# Patient Record
Sex: FEMALE | Race: WHITE | NOT HISPANIC OR LATINO | Employment: OTHER | URBAN - METROPOLITAN AREA
[De-identification: names, ages, dates, MRNs, and addresses within clinical notes are randomized per-mention and may not be internally consistent; named-entity substitution may affect disease eponyms.]

---

## 2022-11-16 ENCOUNTER — MEDICAL CORRESPONDENCE (OUTPATIENT)
Dept: HEALTH INFORMATION MANAGEMENT | Facility: CLINIC | Age: 67
End: 2022-11-16

## 2022-11-17 ENCOUNTER — TRANSCRIBE ORDERS (OUTPATIENT)
Dept: OTHER | Age: 67
End: 2022-11-17

## 2022-11-17 DIAGNOSIS — H11.232 SYMBLEPHARON OF LEFT EYE: Primary | ICD-10-CM

## 2022-11-17 DIAGNOSIS — H02.009 ENTROPION, UNSPECIFIED LATERALITY: ICD-10-CM

## 2023-01-11 ENCOUNTER — OFFICE VISIT (OUTPATIENT)
Dept: OPHTHALMOLOGY | Facility: CLINIC | Age: 68
End: 2023-01-11
Attending: OPHTHALMOLOGY
Payer: MEDICARE

## 2023-01-11 DIAGNOSIS — H11.232 SYMBLEPHARON OF LEFT EYE: ICD-10-CM

## 2023-01-11 DIAGNOSIS — H02.009 ENTROPION, UNSPECIFIED LATERALITY: ICD-10-CM

## 2023-01-11 DIAGNOSIS — H02.016 CICATRICIAL ENTROPION OF LEFT EYE: Primary | ICD-10-CM

## 2023-01-11 DIAGNOSIS — Q11.1 ANOPHTHALMIA: ICD-10-CM

## 2023-01-11 PROBLEM — M85.80 OSTEOPENIA: Status: ACTIVE | Noted: 2018-10-29

## 2023-01-11 PROCEDURE — 92285 EXTERNAL OCULAR PHOTOGRAPHY: CPT | Performed by: OPHTHALMOLOGY

## 2023-01-11 PROCEDURE — 99204 OFFICE O/P NEW MOD 45 MIN: CPT | Performed by: OPHTHALMOLOGY

## 2023-01-11 RX ORDER — DIPHENHYDRAMINE HCL 25 MG
1 CAPSULE ORAL
COMMUNITY

## 2023-01-11 RX ORDER — ATORVASTATIN CALCIUM 10 MG/1
10 TABLET, FILM COATED ORAL
COMMUNITY
Start: 2022-12-05

## 2023-01-11 RX ORDER — UREA 20 %
CREAM (GRAM) TOPICAL
COMMUNITY
Start: 2022-08-26

## 2023-01-11 RX ORDER — PROGESTERONE 200 MG/1
200 CAPSULE ORAL DAILY
COMMUNITY
Start: 2022-12-05

## 2023-01-11 RX ORDER — TRAZODONE HYDROCHLORIDE 100 MG/1
100 TABLET ORAL
COMMUNITY
Start: 2022-11-11

## 2023-01-11 RX ORDER — BETAMETHASONE DIPROPIONATE 0.5 MG/G
OINTMENT, AUGMENTED TOPICAL
COMMUNITY
Start: 2022-08-24

## 2023-01-11 RX ORDER — TOBRAMYCIN AND DEXAMETHASONE 3; 1 MG/ML; MG/ML
SUSPENSION/ DROPS OPHTHALMIC
COMMUNITY
Start: 2022-08-12

## 2023-01-11 ASSESSMENT — VISUAL ACUITY
OD_CC: PROS
OS_CC: 20/25
OS_CC+: +2
METHOD: SNELLEN - LINEAR

## 2023-01-11 ASSESSMENT — TONOMETRY
OS_IOP_MMHG: 09
IOP_METHOD: ICARE

## 2023-01-11 NOTE — LETTER
2023         RE:  :  MRN: Sarah Weinstein  1955  [id number]     Dear Dr. Menchaca,    Thank you for asking me to see your patient, Sarah Weinstein, for an oculoplastic   consultation.  My assessment and plan are below.  For further details, please see my attached clinic note.      Chief Complaint(s) and History of Present Illness(es)     Entropion Evaluation            Laterality: left lower lid          Comments    Patient is here for evaluation of entropion left lower eyelid referred by   Dr. Menchaca.  ---    Chemical injury as a child - lye.     Dr. Crane: PROCEDURES:   1.  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  Placement of a scleral-wrapped hydroxyapatite implant, right eye.   3.  Conjunctivoplasty, right eye.   4.  Mucous membrane graft, right eye.   5.  Entropion repair, right lower lid.   6.  Suture tarsorrhaphy, right eye.            Assessment & Plan     Sarah Weinstein is a 67 year old female with the following diagnoses:   Encounter Diagnoses   Name Primary?     Symblepharon of left eye      Entropion, unspecified laterality      Cicatricial entropion of left eye Yes     Anophthalmia      Options:  1. Observation - risk is she does have entropion with lash cornea touch with risk of corneal ulcer    2. Left lower eyelid release of symblepharon, placement of symblepharon ring, and entropion repair - likely sub-cilliary incision with external rotation sutures to prevent further symblepharon formation. Would inject subconjunctival kenalog.    3. We also talked about the option of a mucous membrane graft, but I think we can address her entropion without a mucosal graft at this time.    Likely option 2, but she will think about it.    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 Not interested in surgical management at this time. She has had multiple procedures in the past.         Again, thank you for allowing me to participate in the  care of your patient.      Sincerely,    Rose Alexandre MD    Oculoplastic and Orbital Surgery   Department of Ophthalmology and Visual Neurosciences  [facility]            Department of Ophthalmology and Visual Neurosciences  [facility]    CC: Mehul Menchaca MD  [facility]  [address] [facility] [address]  Via Fax: [phone]

## 2023-01-11 NOTE — PROGRESS NOTES
Chief Complaint(s) and History of Present Illness(es)     Entropion Evaluation            Laterality: left lower lid          Comments    Patient is here for evaluation of entropion left lower eyelid referred by   Dr. Menchaca.  ---    Chemical injury as a child - maureen.    2010 Dr. Crane: PROCEDURES:   1.  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  Placement of a scleral-wrapped hydroxyapatite implant, right eye.   3.  Conjunctivoplasty, right eye.   4.  Mucous membrane graft, right eye.   5.  Entropion repair, right lower lid.   6.  Suture tarsorrhaphy, right eye.            Assessment & Plan     Sarah Weinstein is a 67 year old female with the following diagnoses:   Encounter Diagnoses   Name Primary?     Symblepharon of left eye      Entropion, unspecified laterality      Cicatricial entropion of left eye Yes     Anophthalmia      Options:  1. Observation - risk is she does have entropion with lash cornea touch with risk of corneal ulcer    2. Left lower eyelid release of symblepharon, placement of symblepharon ring, and entropion repair - likely sub-cilliary incision with external rotation sutures to prevent further symblepharon formation. Would inject subconjunctival kenalog.    3. We also talked about the option of a mucous membrane graft, but I think we can address her entropion without a mucosal graft at this time.    Likely option 2, but she will think about it.    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 Not interested in surgical management at this time. She has had multiple procedures in the past.        Attending Physician Attestation: Complete documentation of historical and exam elements from today's encounter can be found in the full encounter summary report (not reduplicated in this progress note). I personally obtained the chief complaint(s) and history of present illness. I confirmed and edited as necessary the review of systems, past  medical/surgical history, family history, social history, and examination findings as documented by others; and I examined the patient myself. I personally reviewed the relevant tests, images, and reports as documented above. I formulated and edited as necessary the assessment and plan and discussed the findings and management plan with the patient.  -Rose Alexandre MD    Today with Sarah Weinstein  and her , Elver, I reviewed the indications, risks, benefits, and alternatives of the proposed surgical procedure including, but not limited to, failure obtain the desired result  and need for additional surgery, bleeding, infection, loss of vision, injury to the eye, and the remote possibility of permanent damage to any organ system or death with the use of anesthesia.  I provided multiple opportunities for the questions, answered all questions to the best of my ability, and confirmed that my answers and my discussion were understood.   Rose Alexandre MD

## 2023-01-11 NOTE — NURSING NOTE
Chief Complaints and History of Present Illnesses   Patient presents with     Entropion Evaluation       Chief Complaint(s) and History of Present Illness(es)     Entropion Evaluation            Laterality: left lower lid          Comments    Patient is here for evaluation of entropion left lower eyelid referred by Dr. Menchaca.  She has Hx of enucleation of right eyeball.  She notes the left eye does not bother her and she has not noticed any issues.                    ZENAIDA Curry  8:54 AM 01/11/2023

## 2023-01-12 PROBLEM — H02.016 CICATRICIAL ENTROPION OF LEFT EYE: Status: ACTIVE | Noted: 2023-01-12

## 2023-01-12 PROBLEM — H11.232 SYMBLEPHARON OF LEFT EYE: Status: ACTIVE | Noted: 2023-01-12

## 2023-01-12 PROBLEM — H02.009: Status: ACTIVE | Noted: 2023-01-12

## 2023-01-27 ENCOUNTER — ANESTHESIA EVENT (OUTPATIENT)
Dept: SURGERY | Facility: AMBULATORY SURGERY CENTER | Age: 68
End: 2023-01-27
Payer: MEDICARE

## 2023-01-27 RX ORDER — OXYCODONE HYDROCHLORIDE 5 MG/1
10 TABLET ORAL EVERY 4 HOURS PRN
Status: DISCONTINUED | OUTPATIENT
Start: 2023-01-27 | End: 2023-01-31 | Stop reason: HOSPADM

## 2023-01-27 RX ORDER — OXYCODONE HYDROCHLORIDE 5 MG/1
5 TABLET ORAL EVERY 4 HOURS PRN
Status: DISCONTINUED | OUTPATIENT
Start: 2023-01-27 | End: 2023-01-31 | Stop reason: HOSPADM

## 2023-01-27 RX ORDER — FENTANYL CITRATE 50 UG/ML
25 INJECTION, SOLUTION INTRAMUSCULAR; INTRAVENOUS
Status: DISCONTINUED | OUTPATIENT
Start: 2023-01-27 | End: 2023-01-31 | Stop reason: HOSPADM

## 2023-01-30 ENCOUNTER — ANESTHESIA (OUTPATIENT)
Dept: SURGERY | Facility: AMBULATORY SURGERY CENTER | Age: 68
End: 2023-01-30
Payer: MEDICARE

## 2023-01-30 ENCOUNTER — TELEPHONE (OUTPATIENT)
Dept: OPHTHALMOLOGY | Facility: CLINIC | Age: 68
End: 2023-01-30
Payer: MEDICARE

## 2023-01-30 ENCOUNTER — HOSPITAL ENCOUNTER (OUTPATIENT)
Facility: AMBULATORY SURGERY CENTER | Age: 68
Discharge: HOME | End: 2023-01-30
Attending: OPHTHALMOLOGY | Admitting: OPHTHALMOLOGY
Payer: MEDICARE

## 2023-01-30 VITALS
DIASTOLIC BLOOD PRESSURE: 83 MMHG | WEIGHT: 190 LBS | OXYGEN SATURATION: 99 % | BODY MASS INDEX: 30.53 KG/M2 | TEMPERATURE: 98 F | RESPIRATION RATE: 16 BRPM | HEIGHT: 66 IN | SYSTOLIC BLOOD PRESSURE: 127 MMHG

## 2023-01-30 DIAGNOSIS — H02.009 ENTROPION, UNSPECIFIED LATERALITY: ICD-10-CM

## 2023-01-30 DIAGNOSIS — H02.016 CICATRICIAL ENTROPION OF LEFT EYE: ICD-10-CM

## 2023-01-30 DIAGNOSIS — H11.232 SYMBLEPHARON OF LEFT EYE: ICD-10-CM

## 2023-01-30 PROCEDURE — G8907 PT DOC NO EVENTS ON DISCHARG: HCPCS

## 2023-01-30 PROCEDURE — G8918 PT W/O PREOP ORDER IV AB PRO: HCPCS

## 2023-01-30 PROCEDURE — 68320 REVISE/GRAFT EYELID LINING: CPT | Mod: LT | Performed by: OPHTHALMOLOGY

## 2023-01-30 PROCEDURE — 67924 REPAIR EYELID DEFECT: CPT | Mod: LT

## 2023-01-30 PROCEDURE — 68330 REVISE EYELID LINING: CPT | Mod: LT

## 2023-01-30 PROCEDURE — 67924 REPAIR EYELID DEFECT: CPT | Mod: E2 | Performed by: OPHTHALMOLOGY

## 2023-01-30 DEVICE — IMPLANTABLE DEVICE: Type: IMPLANTABLE DEVICE | Site: EYE | Status: FUNCTIONAL

## 2023-01-30 RX ORDER — OXYCODONE HYDROCHLORIDE 5 MG/1
5 TABLET ORAL EVERY 4 HOURS PRN
Status: DISCONTINUED | OUTPATIENT
Start: 2023-01-30 | End: 2023-01-31 | Stop reason: HOSPADM

## 2023-01-30 RX ORDER — LIDOCAINE HYDROCHLORIDE 20 MG/ML
INJECTION, SOLUTION INFILTRATION; PERINEURAL PRN
Status: DISCONTINUED | OUTPATIENT
Start: 2023-01-30 | End: 2023-01-30

## 2023-01-30 RX ORDER — MOXIFLOXACIN 5 MG/ML
1 SOLUTION/ DROPS OPHTHALMIC 3 TIMES DAILY
Qty: 3 ML | Refills: 0 | Status: SHIPPED | OUTPATIENT
Start: 2023-01-30 | End: 2023-02-09

## 2023-01-30 RX ORDER — HYDROXYZINE HYDROCHLORIDE 10 MG/1
10 TABLET, FILM COATED ORAL EVERY 6 HOURS PRN
Status: DISCONTINUED | OUTPATIENT
Start: 2023-01-30 | End: 2023-01-31 | Stop reason: HOSPADM

## 2023-01-30 RX ORDER — PROPOFOL 10 MG/ML
INJECTION, EMULSION INTRAVENOUS CONTINUOUS PRN
Status: DISCONTINUED | OUTPATIENT
Start: 2023-01-30 | End: 2023-01-30

## 2023-01-30 RX ORDER — NEOMYCIN SULFATE, POLYMYXIN B SULFATE AND DEXAMETHASONE 3.5; 10000; 1 MG/ML; [USP'U]/ML; MG/ML
SUSPENSION/ DROPS OPHTHALMIC
Qty: 5 ML | Refills: 0 | Status: SHIPPED | OUTPATIENT
Start: 2023-01-30 | End: 2023-02-08

## 2023-01-30 RX ORDER — LIDOCAINE 40 MG/G
CREAM TOPICAL
Status: DISCONTINUED | OUTPATIENT
Start: 2023-01-30 | End: 2023-01-31 | Stop reason: HOSPADM

## 2023-01-30 RX ORDER — DEXAMETHASONE SODIUM PHOSPHATE 4 MG/ML
INJECTION, SOLUTION INTRA-ARTICULAR; INTRALESIONAL; INTRAMUSCULAR; INTRAVENOUS; SOFT TISSUE PRN
Status: DISCONTINUED | OUTPATIENT
Start: 2023-01-30 | End: 2023-01-30

## 2023-01-30 RX ORDER — ACETAMINOPHEN 325 MG/1
975 TABLET ORAL ONCE
Status: COMPLETED | OUTPATIENT
Start: 2023-01-30 | End: 2023-01-30

## 2023-01-30 RX ORDER — FENTANYL CITRATE 50 UG/ML
25 INJECTION, SOLUTION INTRAMUSCULAR; INTRAVENOUS EVERY 5 MIN PRN
Status: DISCONTINUED | OUTPATIENT
Start: 2023-01-30 | End: 2023-01-31 | Stop reason: HOSPADM

## 2023-01-30 RX ORDER — PHENYLEPHRINE HYDROCHLORIDE 10 MG/ML
INJECTION INTRAVENOUS PRN
Status: DISCONTINUED | OUTPATIENT
Start: 2023-01-30 | End: 2023-01-30

## 2023-01-30 RX ORDER — ONDANSETRON 2 MG/ML
INJECTION INTRAMUSCULAR; INTRAVENOUS PRN
Status: DISCONTINUED | OUTPATIENT
Start: 2023-01-30 | End: 2023-01-30

## 2023-01-30 RX ORDER — ERYTHROMYCIN 5 MG/G
OINTMENT OPHTHALMIC PRN
Status: DISCONTINUED | OUTPATIENT
Start: 2023-01-30 | End: 2023-01-30 | Stop reason: HOSPADM

## 2023-01-30 RX ORDER — SODIUM CHLORIDE, SODIUM LACTATE, POTASSIUM CHLORIDE, CALCIUM CHLORIDE 600; 310; 30; 20 MG/100ML; MG/100ML; MG/100ML; MG/100ML
INJECTION, SOLUTION INTRAVENOUS CONTINUOUS
Status: DISCONTINUED | OUTPATIENT
Start: 2023-01-30 | End: 2023-01-31 | Stop reason: HOSPADM

## 2023-01-30 RX ORDER — ONDANSETRON 4 MG/1
4 TABLET, ORALLY DISINTEGRATING ORAL EVERY 30 MIN PRN
Status: DISCONTINUED | OUTPATIENT
Start: 2023-01-30 | End: 2023-01-31 | Stop reason: HOSPADM

## 2023-01-30 RX ORDER — TRIAMCINOLONE ACETONIDE 40 MG/ML
INJECTION, SUSPENSION INTRA-ARTICULAR; INTRAMUSCULAR PRN
Status: DISCONTINUED | OUTPATIENT
Start: 2023-01-30 | End: 2023-01-30 | Stop reason: HOSPADM

## 2023-01-30 RX ORDER — KETOROLAC TROMETHAMINE 30 MG/ML
15 INJECTION, SOLUTION INTRAMUSCULAR; INTRAVENOUS
Status: DISCONTINUED | OUTPATIENT
Start: 2023-01-30 | End: 2023-01-31 | Stop reason: HOSPADM

## 2023-01-30 RX ORDER — ONDANSETRON 2 MG/ML
4 INJECTION INTRAMUSCULAR; INTRAVENOUS EVERY 30 MIN PRN
Status: DISCONTINUED | OUTPATIENT
Start: 2023-01-30 | End: 2023-01-31 | Stop reason: HOSPADM

## 2023-01-30 RX ORDER — ALBUTEROL SULFATE 0.83 MG/ML
2.5 SOLUTION RESPIRATORY (INHALATION) EVERY 4 HOURS PRN
Status: DISCONTINUED | OUTPATIENT
Start: 2023-01-30 | End: 2023-01-31 | Stop reason: HOSPADM

## 2023-01-30 RX ORDER — HALOPERIDOL 5 MG/ML
1 INJECTION INTRAMUSCULAR
Status: DISCONTINUED | OUTPATIENT
Start: 2023-01-30 | End: 2023-01-31 | Stop reason: HOSPADM

## 2023-01-30 RX ORDER — PROPOFOL 10 MG/ML
INJECTION, EMULSION INTRAVENOUS PRN
Status: DISCONTINUED | OUTPATIENT
Start: 2023-01-30 | End: 2023-01-30

## 2023-01-30 RX ORDER — FENTANYL CITRATE 50 UG/ML
INJECTION, SOLUTION INTRAMUSCULAR; INTRAVENOUS PRN
Status: DISCONTINUED | OUTPATIENT
Start: 2023-01-30 | End: 2023-01-30

## 2023-01-30 RX ORDER — FENTANYL CITRATE 50 UG/ML
50 INJECTION, SOLUTION INTRAMUSCULAR; INTRAVENOUS EVERY 5 MIN PRN
Status: DISCONTINUED | OUTPATIENT
Start: 2023-01-30 | End: 2023-01-31 | Stop reason: HOSPADM

## 2023-01-30 RX ORDER — OXYCODONE HYDROCHLORIDE 5 MG/1
10 TABLET ORAL EVERY 4 HOURS PRN
Status: DISCONTINUED | OUTPATIENT
Start: 2023-01-30 | End: 2023-01-31 | Stop reason: HOSPADM

## 2023-01-30 RX ORDER — LABETALOL HYDROCHLORIDE 5 MG/ML
10 INJECTION, SOLUTION INTRAVENOUS
Status: DISCONTINUED | OUTPATIENT
Start: 2023-01-30 | End: 2023-01-31 | Stop reason: HOSPADM

## 2023-01-30 RX ORDER — TETRACAINE HYDROCHLORIDE 5 MG/ML
SOLUTION OPHTHALMIC PRN
Status: DISCONTINUED | OUTPATIENT
Start: 2023-01-30 | End: 2023-01-30 | Stop reason: HOSPADM

## 2023-01-30 RX ORDER — DIAZEPAM 10 MG/2ML
2.5 INJECTION, SOLUTION INTRAMUSCULAR; INTRAVENOUS
Status: DISCONTINUED | OUTPATIENT
Start: 2023-01-30 | End: 2023-01-31 | Stop reason: HOSPADM

## 2023-01-30 RX ORDER — MEPERIDINE HYDROCHLORIDE 25 MG/ML
12.5 INJECTION INTRAMUSCULAR; INTRAVENOUS; SUBCUTANEOUS EVERY 5 MIN PRN
Status: DISCONTINUED | OUTPATIENT
Start: 2023-01-30 | End: 2023-01-31 | Stop reason: HOSPADM

## 2023-01-30 RX ORDER — ERYTHROMYCIN 5 MG/G
OINTMENT OPHTHALMIC
Qty: 3.5 G | Refills: 0 | Status: SHIPPED | OUTPATIENT
Start: 2023-01-30

## 2023-01-30 RX ORDER — DEXAMETHASONE SODIUM PHOSPHATE 4 MG/ML
4 INJECTION, SOLUTION INTRA-ARTICULAR; INTRALESIONAL; INTRAMUSCULAR; INTRAVENOUS; SOFT TISSUE
Status: DISCONTINUED | OUTPATIENT
Start: 2023-01-30 | End: 2023-01-31 | Stop reason: HOSPADM

## 2023-01-30 RX ORDER — CITRIC ACID/SODIUM CITRATE 334-500MG
30 SOLUTION, ORAL ORAL ONCE
Status: COMPLETED | OUTPATIENT
Start: 2023-01-30 | End: 2023-01-30

## 2023-01-30 RX ADMIN — Medication 50 MG: at 08:48

## 2023-01-30 RX ADMIN — FENTANYL CITRATE 25 MCG: 50 INJECTION, SOLUTION INTRAMUSCULAR; INTRAVENOUS at 08:48

## 2023-01-30 RX ADMIN — ACETAMINOPHEN 975 MG: 325 TABLET ORAL at 08:12

## 2023-01-30 RX ADMIN — SODIUM CHLORIDE, SODIUM LACTATE, POTASSIUM CHLORIDE, CALCIUM CHLORIDE: 600; 310; 30; 20 INJECTION, SOLUTION INTRAVENOUS at 08:39

## 2023-01-30 RX ADMIN — LIDOCAINE HYDROCHLORIDE 60 MG: 20 INJECTION, SOLUTION INFILTRATION; PERINEURAL at 08:48

## 2023-01-30 RX ADMIN — FENTANYL CITRATE 25 MCG: 50 INJECTION, SOLUTION INTRAMUSCULAR; INTRAVENOUS at 08:59

## 2023-01-30 RX ADMIN — PROPOFOL 150 MCG/KG/MIN: 10 INJECTION, EMULSION INTRAVENOUS at 08:48

## 2023-01-30 RX ADMIN — ONDANSETRON 4 MG: 2 INJECTION INTRAMUSCULAR; INTRAVENOUS at 09:09

## 2023-01-30 RX ADMIN — Medication 30 ML: at 08:39

## 2023-01-30 RX ADMIN — PHENYLEPHRINE HYDROCHLORIDE 100 MCG: 10 INJECTION INTRAVENOUS at 08:48

## 2023-01-30 RX ADMIN — DEXAMETHASONE SODIUM PHOSPHATE 8 MG: 4 INJECTION, SOLUTION INTRA-ARTICULAR; INTRALESIONAL; INTRAMUSCULAR; INTRAVENOUS; SOFT TISSUE at 09:09

## 2023-01-30 RX ADMIN — PROPOFOL 150 MG: 10 INJECTION, EMULSION INTRAVENOUS at 08:48

## 2023-01-30 NOTE — ANESTHESIA PROCEDURE NOTES
Airway       Patient location during procedure: OR       Procedure Start/Stop Times: 1/30/2023 8:49 AM  Staff -        Performed By: CRNAIndications and Patient Condition       Indications for airway management: josemanuel-procedural       Induction type:intravenous       Mask difficulty assessment: 2 - vent by mask + OA or adjuvant +/- NMBA    Final Airway Details       Final airway type: endotracheal airway       Successful airway: ETT - single and Oral  Endotracheal Airway Details        ETT size (mm): 7.0       Successful intubation technique: direct laryngoscopy       DL Blade Type: MAC 3       Grade View of Cords: 2       Adjucts: stylet       Position: Right    Post intubation assessment        Placement verified by: capnometry, equal breath sounds and chest rise        Number of attempts at approach: 1       Secured with: plastic tape       Ease of procedure: easy       Dentition: Intact and Unchanged    Medication(s) Administered   Medication Administration Time: 1/30/2023 8:49 AM

## 2023-01-30 NOTE — DISCHARGE INSTRUCTIONS
Post-operative Instructions  Ophthalmic Plastic and Reconstructive Surgery    Rose Alexandre M.D.     All instructions apply to the operated eye(s) or eyelid(s).    Wound care and personal care  ? If a patch or bandage has been placed, please leave this in place until seen by your physician. Ensure that the bandage does not get wet when you take a shower.   ? Apply ice compresses 15 minutes of every hour while awake for 2 days. If you are sleeping, you don't need to wake up to ice. As long as there is no further bleeding, after two days, switch to warm water compresses for five minutes, four times a day until seen by your physician.   ? You may shower or wash your hair the day after surgery. Do not go swimming for at least 2 weeks to prevent contamination of your wounds.  ? You may go for walks and light activity is ok, but no heavy (over 15 pounds) lifting, bending or excessive straining for one week.   ? Do not apply make-up to the eyes or eyelids for 2 weeks after surgery.  ? Expect some swelling, bruising, black eye (even into the lower eyelids and cheeks). Also expect serum caking, crusting and discharge from the eye and/or incisions. A small amount of surface bleeding, and depending on the type of surgery, bleeding from the inside of the eyelid, is normal for the first 48 hours.  ? Avoid straining, bending at the waist, or lifting more than 15 pounds for 1 week. Sleeping with your head elevated, such as in a recliner, for the first several days can help swelling resolve more quickly.   ? Do continue to ambulate (walk) as you normally would - being sedentary after surgery can cause blood clots.   ? Your eye(s) and eyelid(s) may be painful and tender. This is normal after surgery.      Contact information and follow-up  ? Return to the Eye Clinic for a follow-up appointment with your physician as scheduled. If no appointment has been scheduled:     -  [facility]: [phone] for an  appointment with Dr. Alexandre within 2 to 3 weeks from your date of surgery.     ? For severe pain, bleeding, or loss of vision, call the [facility] at [phone] or [facility] at [phone].     After hours or on weekends and holidays, call [phone] and ask to speak with the ophthalmologist on call.    An on call person can be reached after hours for concerns. The on call doctor should not call in medication refill requests after hours or on weekends, so please plan accordingly. An effort has been made to provide adequate pain medications following every surgery, and refills will not be provided in most instances.     Medications  ? Restart all regular home medications and eye drops. If you take Plavix or Aspirin on a regular basis, wait for 72 hours after your surgery before restarting these in order to decrease the risk of bleeding complications.  ? Avoid aspirin and aspirin-like medications (Motrin, Aleve, Ibuprofen, [place] etc) for 72 hours to reduce the risk of bleeding. You may take Tylenol (acetaminophen) for pain.  ? In addition to your home medications, take the following post-operative medications as prescribed by your physician.    ? Apply antibiotic ointment to all sutures three times a day.   Once you run out, you can apply Vaseline or Aquaphor (over the counter) to the incisions. Don't put the Vaseline or Aquaphor into your eyes.   ? Instill the two prescribed eye drops 3 times a day for 10 days. If your symblepharon ring is not removed by day 10, continue the moxifloxacin drops until it is removed.     [facility]  Same-Day Surgery   Adult Discharge Orders & Instructions   For 24 hours after surgery  Get plenty of rest.  A responsible adult must stay with you for at least 24 hours after you leave the hospital.   Do not drive or use heavy equipment.  If you have weakness or tingling, don't drive or use heavy equipment  until this feeling goes away.  Do not drink alcohol.  Avoid strenuous or risky activities.  Ask for help when climbing stairs.   You may feel lightheaded.  IF so, sit for a few minutes before standing.  Have someone help you get up.   If you have nausea (feel sick to your stomach): Drink only clear liquids such as apple juice, ginger ale, broth or 7-Up.  Rest may also help.  Be sure to drink enough fluids.  Move to a regular diet as you feel able.  You may have a slight fever. Call the doctor if your fever is over 100 F (37.7 C) (taken under the tongue) or lasts longer than 24 hours.  You may have a dry mouth, a sore throat, muscle aches or trouble sleeping.  These should go away after 24 hours.  Do not make important or legal decisions.   Call your doctor for any of the followin.  Signs of infection (fever, growing tenderness at the surgery site, a large amount of drainage or bleeding, severe pain, foul-smelling drainage, redness, swelling).    2. It has been over 8 to 10 hours since surgery and you are still not able to urinate (pass water).    3.  Headache for over 24 hours.    4.  Numbness, tingling or weakness the day after surgery (if you had spinal anesthesia).  To contact a doctor, call ___________________________    TYLENOL GIVEN AT 08:15AM, DO NOT TAKE TYLENOL BEFORE 2:00PM

## 2023-01-30 NOTE — BRIEF OP NOTE
[facility]    Brief Operative Note    Pre-operative diagnosis: Symblepharon of left eye [H11.232]  Entropion, unspecified laterality [H02.009]  Cicatricial entropion of left eye [H02.016]  Post-operative diagnosis Same as pre-operative diagnosis    Procedure: Procedure(s):  Left lower eyelid entropion repair  left lower eyelid release of symblepharon and placement of symblepharon ring  Surgeon: Surgeon(s) and Role:     * Rose Alexandre MD - Primary  Anesthesia: General   Estimated Blood Loss: Minimal    Drains: None  Specimens: * No specimens in log *  Findings:   Evidence of symblepharon and cicatricial entropion.  Complications: None.  Implants:   Implant Name Type Inv. Item Serial No.  Lot No. LRB No. Used Action   longterm Symblepharon ring 20mm    longterm [id number] Left 1 Implanted

## 2023-01-30 NOTE — ANESTHESIA POSTPROCEDURE EVALUATION
Patient: Sarah Weinstein    Procedure: Procedure(s):  Left lower eyelid entropion repair  left lower eyelid release of symblepharon and placement of symblepharon ring       Anesthesia Type:  MAC    Note:  Disposition: Outpatient   Postop Pain Control: Uneventful            Sign Out: Well controlled pain   PONV: No   Neuro/Psych: Uneventful            Sign Out: Acceptable/Baseline neuro status   Airway/Respiratory: Uneventful            Sign Out: Acceptable/Baseline resp. status   CV/Hemodynamics: Uneventful            Sign Out: Acceptable CV status; No obvious hypovolemia; No obvious fluid overload   Other NRE:    DID A NON-ROUTINE EVENT OCCUR? No           Last vitals:  Vitals Value Taken Time   /88 01/30/23 0945   Temp 98  F (36.7  C) 01/30/23 0930   Pulse     Resp 16 01/30/23 0945   SpO2 98 % 01/30/23 0945       Electronically Signed By: Diaz Cross MD  January 30, 2023  11:28 AM

## 2023-01-30 NOTE — ANESTHESIA CARE TRANSFER NOTE
Patient: Sarah Weinstein    Procedure: Procedure(s):  Left lower eyelid entropion repair  left lower eyelid release of symblepharon and placement of symblepharon ring       Diagnosis: Symblepharon of left eye [H11.232]  Entropion, unspecified laterality [H02.009]  Cicatricial entropion of left eye [H02.016]  Diagnosis Additional Information: No value filed.    Anesthesia Type:   MAC     Note:    Oropharynx: oropharynx clear of all foreign objects  Level of Consciousness: awake  Oxygen Supplementation: face mask  Level of Supplemental Oxygen (L/min / FiO2): 7  Independent Airway: airway patency satisfactory and stable  Dentition: dentition unchanged  Vital Signs Stable: post-procedure vital signs reviewed and stable  Report to RN Given: handoff report given  Patient transferred to: PACU  Comments: To PACU after suctioned and extubated awake.  Report to RN. VSS, Respiratory status stable.  Handoff Report: Identifed the Patient, Identified the Reponsible Provider, Reviewed the pertinent medical history, Discussed the surgical course, Reviewed Intra-OP anesthesia mangement and issues during anesthesia, Set expectations for post-procedure period and Allowed opportunity for questions and acknowledgement of understanding      Vitals:  Vitals Value Taken Time   BP     Temp     Pulse     Resp     SpO2         Electronically Signed By: LEXUS Adrian CRNA  January 30, 2023  9:35 AM

## 2023-01-30 NOTE — TELEPHONE ENCOUNTER
1/30 Called and left voicemail. Provided phone number [phone] to reschedule post op appointment on 2/15 to 2/8.     Yahaira turner Procedure   Orthopedics, Podiatry, Sports Medicine, Ent ,Eye , Audiology, Adult Endocrine & Diabetes, Nutrition & Medication Therapy Management Specialties   [facility]       ----- Message from Rose Alexandre MD sent at 1/30/2023  8:23 AM CST -----  Regarding: Post op  Dennis Syed, can you change her postop to next Wednesday 2/8.Thanks!

## 2023-01-30 NOTE — ANESTHESIA PREPROCEDURE EVALUATION
Anesthesia Pre-Procedure Evaluation    Patient: Sarah Weinstein   MRN: [id number] : 1955        Procedure : Procedure(s):  Left lower eyelid entropion repair  left lower eyelid release of symblepharon and placement of symblepharon ring          Past Medical History:   Diagnosis Date     Sleep apnea       Past Surgical History:   Procedure Laterality Date     EYE SURGERY       GYN SURGERY       ORTHOPEDIC SURGERY        Allergies   Allergen Reactions     Albuterol Other (See Comments)     headache     Venlafaxine Diarrhea     Major headache , nausea     Naltrexone Dizziness     Feeling faint      Social History     Tobacco Use     Smoking status: Unknown     Smokeless tobacco: Not on file   Substance Use Topics     Alcohol use: Not on file      Wt Readings from Last 1 Encounters:   No data found for Wt        Anesthesia Evaluation   Pt has had prior anesthetic.     No history of anesthetic complications       ROS/MED HX  ENT/Pulmonary:     (+) sleep apnea, uses CPAP,     Neurologic:  - neg neurologic ROS     Cardiovascular:  - neg cardiovascular ROS  (-) murmur   METS/Exercise Tolerance: >4 METS    Hematologic:  - neg hematologic  ROS     Musculoskeletal:  - neg musculoskeletal ROS     GI/Hepatic:     (+) GERD, Symptomatic,     Renal/Genitourinary:  - neg Renal ROS     Endo:  - neg endo ROS     Psychiatric/Substance Use:       Infectious Disease:  - neg infectious disease ROS     Malignancy:       Other:      (+) , other significant disability Blind (missing eye due to injury as child),          Physical Exam    Airway        Mallampati: III   TM distance: > 3 FB   Neck ROM: full   Mouth opening: > 3 cm    Respiratory Devices and Support         Dental       (+) Minor Abnormalities - some fillings, tiny chips      Cardiovascular          Rhythm and rate: regular and normal (-) no murmur    Pulmonary   pulmonary exam normal        breath sounds clear to auscultation       Other findings: Right eye s/p  enucleation with repair    OUTSIDE LABS:  CBC: No results found for: WBC, HGB, HCT, PLT  BMP: No results found for: NA, POTASSIUM, CHLORIDE, CO2, BUN, CR, GLC  COAGS: No results found for: PTT, INR, FIBR  POC: No results found for: BGM, HCG, HCGS  HEPATIC: No results found for: ALBUMIN, PROTTOTAL, ALT, AST, GGT, ALKPHOS, BILITOTAL, BILIDIRECT, PRUDENCE  OTHER: No results found for: PH, LACT, A1C, MARTÍNEZ, PHOS, MAG, LIPASE, AMYLASE, TSH, T4, T3, CRP, SED    Anesthesia Plan    ASA Status:  2   NPO Status:  NPO Appropriate    Anesthesia Type: General.     - Airway: ETT   Induction: Intravenous, Propofol.   Maintenance: Balanced.        Consents    Anesthesia Plan(s) and associated risks, benefits, and realistic alternatives discussed. Questions answered and patient/representative(s) expressed understanding.    - Discussed:     - Discussed with:  Patient      - Extended Intubation/Ventilatory Support Discussed: No.      - Patient is DNR/DNI Status: No    Use of blood products discussed: No .     Postoperative Care    Pain management: IV analgesics, Oral pain medications, Multi-modal analgesia.   PONV prophylaxis: Ondansetron (or other 5HT-3), Dexamethasone or Solumedrol, Background Propofol Infusion     Comments:    Other Comments: Has acid reflux and chronic cough associated with it when laying flat. Does not need to follow commands for procedure. To both reduce risk of aspiration and risk of coughing during procedure will do under general anesthesia with ETT. Discussed plan for general anesthetic with oral ETT. Discussed risks of sore throat, post op pain/nausea, oropharyngeal damage, rare major complications.              Diaz Cross MD

## 2023-01-30 NOTE — OP NOTE
Procedure Date: 01/30/2023    PREOPERATIVE DIAGNOSES:     1.  Left lower eyelid cicatricial entropion.  2.  Left inferior conjunctival fornix symblepharon.    POSTOPERATIVE DIAGNOSES:    1.  Left lower eyelid cicatricial entropion.  2.  Left inferior conjunctival fornix symblepharon.    PROCEDURES PERFORMED:    1.  Left lower eyelid cicatricial entropion repair.  2.  Left conjunctivoplasty with release of symblepharon, injection of subconjunctival Kenalog, and placement of symblepharon ring, 20 mm.    ANESTHESIA:  General with local infiltration of 50:50 mixture of 2% lidocaine with epinephrine and 0.5% Marcaine.    COMPLICATIONS:  None.    ESTIMATED BLOOD LOSS:  2 mL.    INDICATIONS FOR PROCEDURE:  Sarah Weinstein presented with cicatricial entropion and symblepharon in the inferior fornix secondary to a chemical injury as a child.  Of note, she is monocular due to the same injury.  We discussed risks, benefits, and alternatives of the proposed procedure and she elected to proceed.    DESCRIPTION OF PROCEDURE:  Sarah was brought to the operating room and placed supine on the operating table.  Under general anesthesia with an endotracheal tube, local anesthetic as above was infiltrated into the lower eyelid transconjunctivally and transcutaneously, as well as into the lateral canthal area.  She was prepped and draped in typical sterile fashion for oculoplastic surgery.  Attention was directed to the left side.  The lower eyelid was placed on downward traction and multiple symblepharon bands were identified.  These were dissected to free the bulbar conjunctiva from the tarsal conjunctiva.  Once all this was released, hemostasis was assured.  The inferior fornix was nicely free and reformed, though there was a small area of conjunctival deficit.  This was left to granulate in.  Subconjunctival Kenalog was injected into the palpebral conjunctiva.  Attention was then directed to the entropion.  Lateral canthotomy and  inferior cantholysis was performed.  A lateral tarsal strip was fashioned with the monopolar cautery.  Three 6-0 Vicryl sutures were placed through the lower eyelid retractors through the conjunctival defect and externalized underneath the lash line in a Quickert fashion.  The lateral tarsal strip was imbricated with a double-armed 5-0 Vicryl suture and secured to the superolateral orbital rim in the area of Whitnall's tubercle.  This was tied down nicely positioning the lower eyelid.  A 5-0 Vicryl suture was then used to reform the lateral commissure.  A 20 mm symblepharon ring was placed into the eye.  The three 6-0 Vicryl sutures were then tied down nicely, everting the lower eyelid.  Erythromycin ophthalmic ointment was applied.  She tolerated the procedure well, was extubated and returned to the postoperative area in stable condition.    Rose Alexandre MD        D: 2023   T: 2023   MT: [id number]    Name:     KRISTINEJAYKayla  MRN:      [id number]        Account:        [id number]   :      1955           Procedure Date: 2023     Document: [id number]

## 2023-02-02 NOTE — TELEPHONE ENCOUNTER
2/2 2nd attempt.  Called and left voicemail. Provided phone number [phone] to reschedule post op appointment on 2/15 to 2/8.      Yahaira turner Procedure   Orthopedics, Podiatry, Sports Medicine, Ent ,Eye , Audiology, Adult Endocrine & Diabetes, Nutrition & Medication Therapy Management Specialties   [facility]

## 2023-02-02 NOTE — TELEPHONE ENCOUNTER
Received voicemail from the patient that she needed to reschedule her post op appointment. I called her back and left her a message to call me directly.  Alma Dumont, Surgery Scheduling Coordinator

## 2023-02-08 ENCOUNTER — OFFICE VISIT (OUTPATIENT)
Dept: OPHTHALMOLOGY | Facility: CLINIC | Age: 68
End: 2023-02-08
Payer: MEDICARE

## 2023-02-08 DIAGNOSIS — H11.232 SYMBLEPHARON OF LEFT EYE: Primary | ICD-10-CM

## 2023-02-08 DIAGNOSIS — H02.009 ENTROPION, UNSPECIFIED LATERALITY: ICD-10-CM

## 2023-02-08 DIAGNOSIS — H02.016 CICATRICIAL ENTROPION OF LEFT EYE: ICD-10-CM

## 2023-02-08 PROCEDURE — 99024 POSTOP FOLLOW-UP VISIT: CPT | Performed by: OPHTHALMOLOGY

## 2023-02-08 ASSESSMENT — VISUAL ACUITY
OS_SC+: -1
OS_SC: 20/50
METHOD: SNELLEN - LINEAR
OD_SC: PROS

## 2023-02-08 NOTE — NURSING NOTE
"Chief Complaints and History of Present Illnesses   Patient presents with     Post Op (Ophthalmology) Both Eyes       Chief Complaint(s) and History of Present Illness(es)     Post Op (Ophthalmology) Both Eyes            Laterality: both eyes          Comments    S/P 1.  Left lower eyelid cicatricial entropion repair, 2.  Left conjunctivoplasty with release of symblepharon, injection of subconjunctival Kenalog, and placement of symblepharon ring, 20 mm, 01/30/2023. Done with antibiotic. Using Ats \"a lot\". Left eye is very uncomfortable, wants the symblepharon ring to come out                  Kourtney Hitchcock, COT    "

## 2023-02-08 NOTE — PROGRESS NOTES
"Chief Complaint(s) and History of Present Illness(es)     Post Op (Ophthalmology) Both Eyes            Laterality: both eyes          Comments    S/P 1.  Left lower eyelid cicatricial entropion repair, 2.  Left   conjunctivoplasty with release of symblepharon, injection of   subconjunctival Kenalog, and placement of symblepharon ring, 20 mm,   01/30/2023. Done with antibiotic. Using Ats \"a lot\". Left eye is very   uncomfortable, wants the symblepharon ring to come out                Doing well. Had discomfort with symblepharon ring which was removed today. Lower lid entropion is resolved.     Patient Instructions   Use moxifloxacin ophthalmic drop three times daily for four days then you can stop.     Use erythromycin ophthalmic ointment - apply to incision and 1/2\" strip into your left eye at bedtime.     Use artificial tears frequently throughout the day.    Use warm compresses for about 1 minute twice daily.     Return in about 2 months (around 4/8/2023).      Attending Physician Attestation: Complete documentation of historical and exam elements from today's encounter can be found in the full encounter summary report (not reduplicated in this progress note). I personally obtained the chief complaint(s) and history of present illness. I confirmed and edited as necessary the review of systems, past medical/surgical history, family history, social history, and examination findings as documented by others; and I examined the patient myself. I personally reviewed the relevant tests, images, and reports as documented above. I formulated and edited as necessary the assessment and plan and discussed the findings and management plan with the patient and family. I personally reviewed the ophthalmic test(s) associated with this encounter, agree with the interpretation(s) as documented by the resident/fellow, and have edited the corresponding report(s) as necessary. Rose Alexandre MD    "

## 2023-04-12 ENCOUNTER — OFFICE VISIT (OUTPATIENT)
Dept: OPHTHALMOLOGY | Facility: CLINIC | Age: 68
End: 2023-04-12
Payer: MEDICARE

## 2023-04-12 DIAGNOSIS — H11.232 SYMBLEPHARON OF LEFT EYE: Primary | ICD-10-CM

## 2023-04-12 DIAGNOSIS — H02.016 CICATRICIAL ENTROPION OF LEFT EYE: ICD-10-CM

## 2023-04-12 PROCEDURE — 99024 POSTOP FOLLOW-UP VISIT: CPT | Performed by: OPHTHALMOLOGY

## 2023-04-12 ASSESSMENT — TONOMETRY
OS_IOP_MMHG: 9
OD_IOP_MMHG: PROS
IOP_METHOD: TONOPEN

## 2023-04-12 ASSESSMENT — VISUAL ACUITY
CORRECTION_TYPE: GLASSES
OS_CC: 20/20
OS_CC+: -1
OD_CC: PROS
METHOD: SNELLEN - LINEAR

## 2023-04-12 NOTE — PROGRESS NOTES
Chief Complaint(s) and History of Present Illness(es)     Post Op (Ophthalmology) Left Eye            Laterality: left eye          Comments    S/p left lower eyelid cicatricial entropion repair, left conjunctivoplasty   with release of symblepharon, injection of subconjunctival Kenalog and   placement of symblepharon ring 20 mm 1/30/23.     Symblepharon ring removed at 2/8/23 visit.   Pt had vision problems right after ring was removed but seems to be   settling back to normal now. Does not feel as much pulling on the eyelid   when she looks up.   Pt uses artificial tears prn.          Sarah Weinstein is about 2 months status post op left lower lid cicatricial entropion repair with release of symblepharon.    She is doing well. No residual entropion on the left side. Improved symbelpharon.     Right side (anophthalmic) with severe entropion but she has had numerous prior surgeries and has decided she would not like to attempt any further procedures.     She will follow up with me on an as needed basis.    Complete documentation of historical and exam elements from today's encounter can be found in the full encounter summary report (not reduplicated in this progress note). I personally obtained the chief complaint(s) and history of present illness.  I confirmed and edited as necessary the review of systems, past medical/surgical history, family history, social history, and examination findings as documented by others; and I examined the patient myself. I personally reviewed the relevant tests, images, and reports as documented above. I formulated and edited as necessary the assessment and plan and discussed the findings and management plan with the patient and family. - Rose Alexandre MD

## 2023-04-12 NOTE — LETTER
4/12/2023         RE: Sarah Weinstein  [address] [place] [address]        Dear Dr. Menchaca,    Thank you for referring your patient, Sarah Weinstein, to the [facility]. Please see a copy of my visit note below.    Chief Complaint(s) and History of Present Illness(es)     Post Op (Ophthalmology) Left Eye            Laterality: left eye          Comments    S/p left lower eyelid cicatricial entropion repair, left conjunctivoplasty   with release of symblepharon, injection of subconjunctival Kenalog and   placement of symblepharon ring 20 mm 1/30/23.     Symblepharon ring removed at 2/8/23 visit.   Pt had vision problems right after ring was removed but seems to be   settling back to normal now. Does not feel as much pulling on the eyelid   when she looks up.   Pt uses artificial tears prn.          Sarah Weinstein is about 2 months status post op left lower lid cicatricial entropion repair with release of symblepharon.    She is doing well. No residual entropion on the left side. Improved symbelpharon.     Right side (anophthalmic) with severe entropion but she has had numerous prior surgeries and has decided she would not like to attempt any further procedures.     She will follow up with me on an as needed basis.    Complete documentation of historical and exam elements from today's encounter can be found in the full encounter summary report (not reduplicated in this progress note). I personally obtained the chief complaint(s) and history of present illness.  I confirmed and edited as necessary the review of systems, past medical/surgical history, family history, social history, and examination findings as documented by others; and I examined the patient myself. I personally reviewed the relevant tests, images, and reports as documented above. I formulated and edited as necessary the assessment and plan and discussed the findings and management plan with the patient and  family. - Rose Alexandre MD        Again, thank you for allowing me to participate in the care of your patient.        Sincerely,        Rose Alexandre MD

## 2023-04-12 NOTE — NURSING NOTE
Chief Complaints and History of Present Illnesses   Patient presents with     Post Op (Ophthalmology) Left Eye       Chief Complaint(s) and History of Present Illness(es)     Post Op (Ophthalmology) Left Eye            Laterality: left eye          Comments    S/p left lower eyelid cicatricial entropion repair, left conjunctivoplasty with release of symblepharon, injection of subconjunctival Kenalog and placement of symblepharon ring 20 mm 1/30/23.     Symblepharon ring removed at 2/8/23 visit.   Pt had vision problems right after ring was removed but seems to be settling back to normal now. Does not feel as much pulling on the eyelid when she looks up.   Pt uses artificial tears prn.                    Kourtney Hitchcock, COT

## 2024-11-25 ENCOUNTER — TRANSCRIBE ORDERS (OUTPATIENT)
Dept: OTHER | Age: 69
End: 2024-11-25

## 2024-11-25 DIAGNOSIS — H52.7 REFRACTIVE ERROR: Primary | ICD-10-CM

## 2025-02-05 ENCOUNTER — TELEPHONE (OUTPATIENT)
Dept: OPHTHALMOLOGY | Facility: CLINIC | Age: 70
End: 2025-02-05

## 2025-02-05 ENCOUNTER — OFFICE VISIT (OUTPATIENT)
Dept: OPHTHALMOLOGY | Facility: CLINIC | Age: 70
End: 2025-02-05
Attending: OPHTHALMOLOGY
Payer: MEDICARE

## 2025-02-05 DIAGNOSIS — H11.232 SYMBLEPHARON OF LEFT EYE: Primary | ICD-10-CM

## 2025-02-05 DIAGNOSIS — H02.056: ICD-10-CM

## 2025-02-05 DIAGNOSIS — Q11.1 ANOPHTHALMOS OF RIGHT EYE: ICD-10-CM

## 2025-02-05 DIAGNOSIS — H02.016 CICATRICIAL ENTROPION OF LEFT EYE: ICD-10-CM

## 2025-02-05 ASSESSMENT — TONOMETRY
IOP_METHOD: ICARE
OS_IOP_MMHG: 9
OD_IOP_MMHG: PROS

## 2025-02-05 ASSESSMENT — VISUAL ACUITY
CORRECTION_TYPE: GLASSES
OS_CC+: -3
METHOD: SNELLEN - LINEAR
OS_CC: 20/25
OD_SC: PROS

## 2025-02-05 NOTE — NURSING NOTE
Chief Complaints and History of Present Illnesses   Patient presents with    Trichiasis Evaluation     Chief Complaint(s) and History of Present Illness(es)       Trichiasis Evaluation              Laterality: left lower lid              Comments    Pt referred back by Dr. Menchaca as pt is having some difficulty with the lashes of the left lower lid rubbing against the eye. (S/P Left lower eyelid entropion repair left lower eyelid release of symblepharon and placement of symblepharon ring 1/30/2023)     She has associated FBS, tearing and irritation on the left side. She has been prescribed EES wenceslao, but it makes the eye too blurry for her to function so she has discontinued. She has been using AT's when symptomatic, but these only provide short relief from the symptoms.    Pt wonders if the lids on the right side have become loose as the prosthetic shell keeps coming out when sleeping and when wearing goggles when swimming.

## 2025-02-05 NOTE — PROGRESS NOTES
Chief Complaint(s) and History of Present Illness(es)     Trichiasis Evaluation            Laterality: left lower lid          Comments    Pt referred back by Dr. Menchaca as pt is having some difficulty with the   lashes of the left lower lid rubbing against the eye. (S/P Left lower   eyelid entropion repair left lower eyelid release of symblepharon and   placement of symblepharon ring 1/30/2023)     She has associated FBS, tearing and irritation on the left side. She has   been prescribed EES wenceslao, but it makes the eye too blurry for her to   function so she has discontinued. She has been using AT's when   symptomatic, but these only provide short relief from the symptoms.    Pt wonders if the lids on the right side have become loose as the   prosthetic shell keeps coming out when sleeping and when wearing goggles   when swimming.      Assessment & Plan     Sarah Weinstein is a 70 year old female with the following diagnoses:   Encounter Diagnoses   Name Primary?    Symblepharon of left eye Yes    Cicatricial entropion of left eye     Cicatricial trichiasis, left     Anophthalmos of right eye        History of chemical injury as a child. 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 Contracted socket, but she is ok not addressing at this time.    Significant trichiasis lateral lower lid, most recently underwent left lower eyelid entropion repair and release of symblepharon with me 2 years ago. 2 months ago noted lashes rubbing again.     Options discussed:  Epilation every 6 or so weeks  RFE treatments  Epilate today and return for left lower eyelid entropion repair.    Return visits are difficult for her given her husbands Parkinson's. She would like the lashes epilated today and she will call when she can proceed with left lower eyelid entropion repair. TAKE PRE-PROCEDURE PHOTO.   Procedure would be tarsal strip and maybe  two Quickerts laterally.     About 10 lashes were epilated at the slit lamp latera left lower eyelid today. I was present for the entire procedure. Rose Alexandre MD      Patient disposition:   Return for At her convenience procedure appointment left lower eyelid entropion.        Attending Physician Attestation: Complete documentation of historical and exam elements from today's encounter can be found in the full encounter summary report (not reduplicated in this progress note). I personally obtained the chief complaint(s) and history of present illness. I confirmed and edited as necessary the review of systems, past medical/surgical history, family history, social history, and examination findings as documented by others; and I examined the patient myself. I personally reviewed the relevant tests, images, and reports as documented above. I formulated and edited as necessary the assessment and plan and discussed the findings and management plan with the patient.  -Rose Alexandre MD    Today with Sarah Weinstein  and her , I reviewed the indications, risks, benefits, and alternatives of the proposed surgical procedure including, but not limited to, failure obtain the desired result  and need for additional surgery, bleeding, infection, recurrent entropion or trichiasis.  I provided multiple opportunities for the questions, answered all questions to the best of my ability, and confirmed that my answers and my discussion were understood.   Rose Alexandre MD

## 2025-02-05 NOTE — TELEPHONE ENCOUNTER
Left Voicemail (1st Attempt) for the patient to call back and schedule the following:    Appointment type: return  Provider: dr. cotton  Return date: next opening   Specialty phone number: [phone]   Additonal Notes: Return for At her convenience procedure appointment left lower eyelid entropion.      [facility]   Orthopedics, Podiatry, Sports Medicine, Ent ,Eye , Audiology, Adult Endocrine & Diabetes, Nutrition & Medication Therapy Management Specialties   [facility]

## (undated) DEVICE — SU VICRYL 5-0 S-14 12" J553G

## (undated) DEVICE — MARKER SKIN DOUBLE TIP W/FLEXI-RULER W/LABELS

## (undated) DEVICE — NDL 30GA 0.5" 305106

## (undated) DEVICE — SOL WATER IRRIG 1000ML BOTTLE 07139-09

## (undated) DEVICE — SYR 03ML LL W/O NDL

## (undated) DEVICE — ESU EYE HIGH TEMP 65410-183

## (undated) DEVICE — PREP CHLORAPREP 26ML TINTED ORANGE  260815

## (undated) DEVICE — GLOVE PROTEXIS W/NEU-THERA 7.5  2D73TE75

## (undated) DEVICE — PACK MINOR EYE

## (undated) DEVICE — SU VICRYL 6-0 S-14DA 18" J570G

## (undated) DEVICE — ESU ELEC NDL 1" COATED/INSULATED E1465

## (undated) RX ORDER — PROPOFOL 10 MG/ML
INJECTION, EMULSION INTRAVENOUS
Status: DISPENSED
Start: 2023-01-30

## (undated) RX ORDER — ACETAMINOPHEN 325 MG/1
TABLET ORAL
Status: DISPENSED
Start: 2023-01-30

## (undated) RX ORDER — DEXAMETHASONE SODIUM PHOSPHATE 4 MG/ML
INJECTION, SOLUTION INTRA-ARTICULAR; INTRALESIONAL; INTRAMUSCULAR; INTRAVENOUS; SOFT TISSUE
Status: DISPENSED
Start: 2023-01-30

## (undated) RX ORDER — CITRIC ACID/SODIUM CITRATE 334-500MG
SOLUTION, ORAL ORAL
Status: DISPENSED
Start: 2023-01-30

## (undated) RX ORDER — ONDANSETRON 2 MG/ML
INJECTION INTRAMUSCULAR; INTRAVENOUS
Status: DISPENSED
Start: 2023-01-30

## (undated) RX ORDER — FENTANYL CITRATE 50 UG/ML
INJECTION, SOLUTION INTRAMUSCULAR; INTRAVENOUS
Status: DISPENSED
Start: 2023-01-30